# Patient Record
Sex: MALE | Race: BLACK OR AFRICAN AMERICAN
[De-identification: names, ages, dates, MRNs, and addresses within clinical notes are randomized per-mention and may not be internally consistent; named-entity substitution may affect disease eponyms.]

---

## 2017-04-24 ENCOUNTER — HOSPITAL ENCOUNTER (EMERGENCY)
Dept: HOSPITAL 72 - EMR | Age: 22
Discharge: HOME | End: 2017-04-24
Payer: MEDICAID

## 2017-04-24 VITALS — DIASTOLIC BLOOD PRESSURE: 74 MMHG | SYSTOLIC BLOOD PRESSURE: 124 MMHG

## 2017-04-24 VITALS — HEIGHT: 72 IN | WEIGHT: 160 LBS | BODY MASS INDEX: 21.67 KG/M2

## 2017-04-24 VITALS — SYSTOLIC BLOOD PRESSURE: 124 MMHG | DIASTOLIC BLOOD PRESSURE: 74 MMHG

## 2017-04-24 DIAGNOSIS — J02.9: Primary | ICD-10-CM

## 2017-04-24 DIAGNOSIS — J45.909: ICD-10-CM

## 2017-04-24 DIAGNOSIS — Z91.018: ICD-10-CM

## 2017-04-24 PROCEDURE — 99283 EMERGENCY DEPT VISIT LOW MDM: CPT

## 2017-04-25 NOTE — EMERGENCY ROOM REPORT
History of Present Illness


General


Chief Complaint:  Upper Respiratory Illness


Source:  Patient





Present Illness


HPI


Patient present with complaints of cough and congestion


Ongoing for the past several days


Patient reports 5 days


Also has a sore throat runny nose





Denies any chest pressures of breath denies any back or flank pain





Denies any dysuria frequency





Denies any neck pain or photophobia


Allergies:  


Coded Allergies:  


     Pork (Verified  Allergy, Unknown, 4/24/17)





Patient History


Past Medical History:  see triage record


Pertinent Family History:  none


Reviewed Nursing Documentation:  PMH: Agreed, PSxH: Agreed





Nursing Documentation-PMH


Past Medical History:  No History, Except For


Hx Asthma:  Yes





Review of Systems


All Other Systems:  negative except mentioned in HPI





Physical Exam





Vital Signs








  Date Time  Temp Pulse Resp B/P Pulse Ox O2 Delivery O2 Flow Rate FiO2


 


4/24/17 14:30 98.4  16 124/74 100 Room Air  


 


4/24/17 14:30  101      








Sp02 EP Interpretation:  reviewed, normal


General Appearance:  well appearing, no apparent distress


Head:  normocephalic, atraumatic


Eyes:  bilateral eye EOMI, bilateral eye PERRL


ENT:  hearing grossly normal, TMs + canals normal, uvula midline, pharyngeal 

erythema


Neck:  full range of motion, supple, no meningismus, no bony tend


Respiratory:  lungs clear, normal breath sounds, no rhonchi, no respiratory 

distress, no retraction, no accessory muscle use


Cardiovascular #1:  normal peripheral pulses, regular rate, rhythm, no edema, 

no gallop, no JVD, no murmur


Gastrointestinal:  normal bowel sounds, non tender, soft, no mass, no 

organomegaly, non-distended, no guarding, no hernia, no pulsatile mass, no 

rebound


Musculoskeletal:  normal inspection


Neurologic:  oriented x3, responsive, CNs III-XII nml as tested, motor strength/

tone normal, sensory intact


Psychiatric:  mood/affect normal


Skin:  normal color, no rash, warm/dry, palpation normal


Lymphatic:  normal inspection, no adenopathy





Medical Decision Making


Diagnostic Impression:  


 Primary Impression:  


 pharyngitis


ER Course


Patient has findings in line with simple pharyngitis


Placed on oral antibiotics no signs of any peritonsillar abscess


Patient does not appear septic or toxic and is otherwise stable for close 

outpatient follow





Last Vital Signs








  Date Time  Temp Pulse Resp B/P Pulse Ox O2 Delivery O2 Flow Rate FiO2


 


4/24/17 15:11 98.4  18 124/74 100 Room Air  


 


4/24/17 14:35  100      








Status:  unchanged


Disposition:  HOME, SELF-CARE


Condition:  Stable


Scripts


Amoxicillin* (AMOXIL*) 500 Mg Capsule


500 MG ORAL THREE TIMES A DAY, #21 CAP


   Prov: CARIE CLEMENT D.O.         4/24/17


Referrals:  


RUDI MEJIA,REFERRING (PCP)


Patient Instructions:  Pharyngitis, Easy-to-Read





Additional Instructions:  


Patient is provided with the discharge instructions notified to follow up with 

primary doctor in the next 2-3 days otherwise return to the er with any 

worsening symptoms.


Please note that this report is being documented using DRAGON technology.  This 

can lead to erroneous entry secondary to incorrect interpretation by the 

dictating instrument.











CARIE CLEMENT D.O. Apr 25, 2017 21:43

## 2020-04-18 ENCOUNTER — HOSPITAL ENCOUNTER (EMERGENCY)
Dept: HOSPITAL 15 - ER | Age: 25
LOS: 1 days | Discharge: HOME | End: 2020-04-19
Payer: MEDICAID

## 2020-04-18 VITALS — HEIGHT: 72 IN | BODY MASS INDEX: 20.99 KG/M2 | WEIGHT: 155 LBS

## 2020-04-18 VITALS — SYSTOLIC BLOOD PRESSURE: 125 MMHG | DIASTOLIC BLOOD PRESSURE: 88 MMHG

## 2020-04-18 DIAGNOSIS — K52.9: Primary | ICD-10-CM

## 2020-04-18 LAB
ALBUMIN SERPL-MCNC: 4.5 G/DL (ref 3.4–5)
ALP SERPL-CCNC: 122 U/L (ref 45–117)
ALT SERPL-CCNC: 51 U/L (ref 16–61)
ANION GAP SERPL CALCULATED.3IONS-SCNC: 8 MMOL/L (ref 5–15)
BILIRUB SERPL-MCNC: 0.6 MG/DL (ref 0.2–1)
BUN SERPL-MCNC: 13 MG/DL (ref 7–18)
BUN/CREAT SERPL: 10.7
CALCIUM SERPL-MCNC: 9.8 MG/DL (ref 8.5–10.1)
CHLORIDE SERPL-SCNC: 100 MMOL/L (ref 98–107)
CO2 SERPL-SCNC: 28 MMOL/L (ref 21–32)
GLUCOSE SERPL-MCNC: 102 MG/DL (ref 74–106)
HCT VFR BLD AUTO: 52.5 % (ref 41–53)
HGB BLD-MCNC: 17.9 G/DL (ref 13.5–17.5)
MCH RBC QN AUTO: 32.3 PG (ref 28–32)
MCV RBC AUTO: 94.5 FL (ref 80–100)
NRBC BLD QL AUTO: 0.1 %
POTASSIUM SERPL-SCNC: 4.4 MMOL/L (ref 3.5–5.1)
PROT SERPL-MCNC: 9.2 G/DL (ref 6.4–8.2)
SODIUM SERPL-SCNC: 136 MMOL/L (ref 136–145)

## 2020-04-18 PROCEDURE — 85025 COMPLETE CBC W/AUTO DIFF WBC: CPT

## 2020-04-18 PROCEDURE — 80053 COMPREHEN METABOLIC PANEL: CPT

## 2020-04-18 PROCEDURE — 74176 CT ABD & PELVIS W/O CONTRAST: CPT

## 2020-04-18 PROCEDURE — 81001 URINALYSIS AUTO W/SCOPE: CPT

## 2020-04-18 PROCEDURE — 80320 DRUG SCREEN QUANTALCOHOLS: CPT

## 2020-04-18 PROCEDURE — 80307 DRUG TEST PRSMV CHEM ANLYZR: CPT

## 2020-04-18 PROCEDURE — 36415 COLL VENOUS BLD VENIPUNCTURE: CPT

## 2020-04-19 LAB
ALCOHOL, URINE: 5 MG/DL (ref 0–5)
AMPHETAMINES UR QL SCN: POSITIVE
BARBITURATES UR QL SCN: NEGATIVE
BENZODIAZ UR QL SCN: NEGATIVE
BZE UR QL SCN: NEGATIVE
CANNABINOIDS UR QL SCN: POSITIVE
OPIATES UR QL SCN: NEGATIVE
PCP UR QL SCN: NEGATIVE